# Patient Record
Sex: MALE | Race: BLACK OR AFRICAN AMERICAN | NOT HISPANIC OR LATINO | URBAN - METROPOLITAN AREA
[De-identification: names, ages, dates, MRNs, and addresses within clinical notes are randomized per-mention and may not be internally consistent; named-entity substitution may affect disease eponyms.]

---

## 2022-10-04 ENCOUNTER — EMERGENCY (EMERGENCY)
Facility: HOSPITAL | Age: 62
LOS: 1 days | Discharge: HOME | End: 2022-10-04
Attending: EMERGENCY MEDICINE | Admitting: EMERGENCY MEDICINE

## 2022-10-04 VITALS
OXYGEN SATURATION: 99 % | HEIGHT: 60 IN | SYSTOLIC BLOOD PRESSURE: 134 MMHG | RESPIRATION RATE: 18 BRPM | TEMPERATURE: 99 F | DIASTOLIC BLOOD PRESSURE: 66 MMHG | HEART RATE: 94 BPM

## 2022-10-04 PROCEDURE — 99284 EMERGENCY DEPT VISIT MOD MDM: CPT

## 2022-10-04 PROCEDURE — 71045 X-RAY EXAM CHEST 1 VIEW: CPT | Mod: 26

## 2022-10-04 RX ORDER — ALBUTEROL 90 UG/1
2.5 AEROSOL, METERED ORAL ONCE
Refills: 0 | Status: COMPLETED | OUTPATIENT
Start: 2022-10-04 | End: 2022-10-04

## 2022-10-04 RX ORDER — ALBUTEROL 90 UG/1
1 AEROSOL, METERED ORAL ONCE
Refills: 0 | Status: COMPLETED | OUTPATIENT
Start: 2022-10-04 | End: 2022-10-04

## 2022-10-04 RX ORDER — IBUPROFEN 200 MG
600 TABLET ORAL ONCE
Refills: 0 | Status: COMPLETED | OUTPATIENT
Start: 2022-10-04 | End: 2022-10-04

## 2022-10-04 RX ORDER — DIPHENHYDRAMINE HCL 50 MG
50 CAPSULE ORAL ONCE
Refills: 0 | Status: COMPLETED | OUTPATIENT
Start: 2022-10-04 | End: 2022-10-04

## 2022-10-04 RX ADMIN — Medication 50 MILLIGRAM(S): at 17:24

## 2022-10-04 RX ADMIN — Medication 600 MILLIGRAM(S): at 17:24

## 2022-10-04 RX ADMIN — ALBUTEROL 1 PUFF(S): 90 AEROSOL, METERED ORAL at 19:22

## 2022-10-04 RX ADMIN — ALBUTEROL 2.5 MILLIGRAM(S): 90 AEROSOL, METERED ORAL at 17:24

## 2022-10-04 NOTE — ED PROVIDER NOTE - PATIENT PORTAL LINK FT
You can access the FollowMyHealth Patient Portal offered by [facility] by registering at the following website: [email or web address]. By joining [employer]’s FollowMyHealth portal, you will also be able to view your health information using other applications (apps) compatible with our system.

## 2022-10-04 NOTE — ED PROVIDER NOTE - NS ED ROS FT
Constitutional: no fevers, chills  HEENT: no HA, vision changes, rhinorrhea, +sore throat  Cardiac: no chest pain, palpitations  Respiratory: no SOB at rest, +cough or hemoptysis  GI: no n/v/d/c, abd pain, bloody or dark stools  : no dysuria, frequency, or hematuria  MSK: no joint pain, neck pain or back pain  Skin: no rashes, jaundice, pruritis  Neuro: no numbness/tingling, weakness, unsteady gait  ROS otherwise neg except per hpi

## 2022-10-04 NOTE — ED PROVIDER NOTE - CLINICAL SUMMARY MEDICAL DECISION MAKING FREE TEXT BOX
well appearing obese male with reassuring hx and physical exam. no signs or symptoms concerning for active bacterial infection/acs/vte. vitals within normal limits. treat pain. reassess. dc with pcp follow up. well appearing obese male with reassuring hx and physical exam. no signs or symptoms concerning for active bacterial infection/acs/vte. vitals within normal limits. treat pain. reassess. dc with pcp follow up.    INTERPRETATION:  Bibasilar atelectasis. No pneumonia.

## 2022-10-04 NOTE — ED PROVIDER NOTE - NSFOLLOWUPINSTRUCTIONS_ED_ALL_ED_FT
You were seen in the Emergency Department for sore throat and shortness of breath with activity.     1) Advance activity as tolerated.   2) Continue all previously prescribed medications as directed.    3) Follow up with your primary care physician in 24-48 hours - take copies of your results.    4) Return to the Emergency Department for worsening or persistent symptoms, and/or ANY NEW OR CONCERNING SYMPTOMS.      you can take tylenol and motrin for pain in your chest. hot packs and lidocaine patches can also be found over the counter to help with the discomfort in your chest when you cough. cough suppressants such as benadryl can help you sleep more comfortably at night and keep you from waking up with coughing fits.     SEEK IMMEDIATE MEDICAL CARE IF YOU HAVE ANY OF THE FOLLOWING SYMPTOMS: worsening shortness of breath, chest pain, back pain, abdominal pain, fever, coughing up blood, lightheadedness/dizziness.

## 2022-10-04 NOTE — ED PROVIDER NOTE - OBJECTIVE STATEMENT
hx HTN SATINDER presents known covid+ with c/o sore throat HAAS and fatigue since 9/30 with +covid test on 10/2. states that he has soreness in his lateral chest wall bilaterally on coughing without substernal chest pain diaphoresis n/v numbness/tingling in the hands, or LE edema/pain/pleurisy. becomes sob after climbing 1-2 flights of stairs. no issues with level ground. no fever chills. tolerating po. pfizer vaccinated with latest booster 6m ago. nonsmoker

## 2022-10-04 NOTE — ED PROVIDER NOTE - PHYSICAL EXAMINATION
General: non-toxic, NAD  HEENT: NCAT, PERRL, no conjunctival pallor. pharynx erythematous without exudate or tonsillar enlargement.   Cardiac: RRR, no murmurs, 2+ peripheral pulses  Resp: CTAB  Abdomen: soft, non-distended, bowel sounds present, no ttp, no rebound or guarding. no organomegaly  Extremities: no peripheral edema, calf tenderness, or leg size discrepancies  Skin: no rashes  Neuro: AAOx4, 5+motor, sensation grossly intact  Psych: mood and affect appropriate    ambulatory sat >95% without fatigue or cp.

## 2022-10-04 NOTE — ED PROVIDER NOTE - ATTENDING CONTRIBUTION TO CARE
The patient is a 61y Male who tested + for COVID 2 days ago  has no pertinent past medical history  PTED with  SOB cough .    Vital Signs Last 24 Hrs  T(F): 98.6 HR: 94 BP: 134/66: 18 SpO2: 99% (04 Oct 2022 15:52)   PE: as described; my additions and exceptions are noted in the chart      IMPRESSION/RISK:  Dx=Covid    Consideration include CXR symptomatic tx probable d/c   Plan  meds   CXR   reasses probabele d/c with symptomatic tx and RTEd PRN